# Patient Record
(demographics unavailable — no encounter records)

---

## 2025-03-25 NOTE — REASON FOR VISIT
[Initial Consultation] : an initial consultation for [Mother] : mother [FreeTextEntry3] : Screening for cardiovascular condition

## 2025-03-25 NOTE — CONSULT LETTER
[Today's Date] : [unfilled] [Name] : Name: [unfilled] [] : : ~~ [Today's Date:] : [unfilled] [Dear  ___:] : Dear Dr. [unfilled]: [Consult - Single Provider] : Thank you very much for allowing me to participate in the care of this patient. If you have any questions, please do not hesitate to contact me. [Sincerely,] : Sincerely, [FreeTextEntry4] : Dr. Kalia Snow MD [FreeTextEntry5] :  [id number], [place] [address] [FreeTextEntry6] : [phone]

## 2025-03-25 NOTE — CONSULT LETTER
[Today's Date] : [unfilled] [Name] : Name: [unfilled] [] : : ~~ [Today's Date:] : [unfilled] [Dear  ___:] : Dear Dr. [unfilled]: [Consult - Single Provider] : Thank you very much for allowing me to participate in the care of this patient. If you have any questions, please do not hesitate to contact me. [Sincerely,] : Sincerely, [FreeTextEntry4] : Dr. Kalia Snow MD [FreeTextEntry5] :  [id number], [place] [address] [FreeTextEntry6] : [phone]

## 2025-03-25 NOTE — REVIEW OF SYSTEMS
[Breastmilk] : Breastmilk ~M [___ Formula] : [unfilled] Formula  [___ ounces/feeding] : ~TIEN elliott/feeding [___ Times/day] : [unfilled] times/day

## 2025-05-13 NOTE — REVIEW OF SYSTEMS
[___ Formula] : [unfilled] Formula  [___ ounces/feeding] : ~TIEN elliott/feeding [___ Times/day] : [unfilled] times/day

## 2025-05-21 NOTE — REASON FOR VISIT
[Follow-Up] : a follow-up visit for [Mother] : mother [FreeTextEntry3] : history of LSVC [Time Spent: ____ minutes] : Total time spent using  services: [unfilled] minutes. The patient's primary language is not English thus required  services. [Interpreters_IDNumber] : [id number] [Interpreters_FullName] : Lewis [TWNoteComboBox1] : Libyan

## 2025-05-21 NOTE — REASON FOR VISIT
[Follow-Up] : a follow-up visit for [Mother] : mother [FreeTextEntry3] : history of LSVC [Time Spent: ____ minutes] : Total time spent using  services: [unfilled] minutes. The patient's primary language is not English thus required  services. [Interpreters_IDNumber] : [id number] [Interpreters_FullName] : Lewis [TWNoteComboBox1] : Micronesian

## 2025-05-21 NOTE — CARDIOLOGY SUMMARY
[Today's Date] : [unfilled] [FreeTextEntry1] : Normal sinus rhythm with a ventricular rate of 142 beats per minute. Normal ventricular axis (normal QRS axis of 94 degrees) and normal intervals for age.    [FreeTextEntry2] : Summary:  1. Follow up study to assess left ventricular size and function. 2. Globular left ventricle and mildly dilated left ventricle. 3. Left ventricular ejection fraction by 5/6 Area x Length is borderline decreased at 51 %. 4. No evidence of aortic valve regurgitation. 5. No mitral valve regurgitation. 6. Normal systolic configuration of interventricular septum. 7. No right SVC. 8. 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 9. Patent foramen ovale with left to right shunt, normal variant. 10. Normal right ventricular morphology with qualitatively normal size and systolic function. 11. No pericardial effusion.

## 2025-05-21 NOTE — HISTORY OF PRESENT ILLNESS
[FreeTextEntry1] : Dave Cannon is a 3-month-old female who was seen in the Pediatric Cardiology clinic for a recommended follow up for mild left ventricular dilation with mild mitral and aortic insufficiency. 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  Mother reports that she is doing well and is completely asymptomatic from the cardiac standpoint. She denies any episodes of cyanosis, pallor, tachypnea or increased work of breathing, diaphoresis with feeds. 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 She is now taking Nutramigen  3 ounces every 3 hours and tolerating the feeds. She is gaining weight.  The remainder of review of systems is all negative.  Family and social history was briefly reviewed and is unchanged.

## 2025-05-21 NOTE — CONSULT LETTER
[Today's Date] : [unfilled] [Name] : Name: [unfilled] [] : : ~~ [Today's Date:] : [unfilled] [Dear  ___:] : Dear Dr. [unfilled]: [Consult - Single Provider] : Thank you very much for allowing me to participate in the care of this patient. If you have any questions, please do not hesitate to contact me. [Sincerely,] : Sincerely, [FreeTextEntry4] : Dr. Kalia Snow MD [FreeTextEntry5] : [address], [place] [address]  [de-identified] : ANKITA Batista, FAAP, FSCMR  of Pediatrics [facility]	 Attending Physician, Pediatric Cardiology Director, Pediatric and Adult Congenital Cardiac MRI [facility]

## 2025-05-21 NOTE — DISCUSSION/SUMMARY
[FreeTextEntry1] : In summary, Dave Cannon is a 3-month-old infant girl with persistent mild left ventricular dilation and borderline decreased systolic function on echocardiogram besides a persistent LSVC to dilated CS and a PFO with left to right flow - a normal variant. She is clinically completely asymptomatic and continues to thrive. I discussed with mother that I had discussed this with our heart failure/cardiomyopathy cardiologist - Dr. Baker and he agrees with me that given this is mild and child is thriving, requires no cardiac interventions at this time including no heart failure medications.  we also discussed that its too early to send her for cardiogenomics and would wait to see if there is any progression or this resolves. She will continue to require close surveillance, and I recommended a follow up in 2 months.   The PFO is a normal variant and needs no intervention. In Dave's case, it appears that the right SVC regressed during fetal life and she has a persistent LSVC to dilated CS which is of no hemodynamic significance and a variant.  This was all explained to mother in detail using the . Mother verbalized understanding and all her questions were answered.  [Needs SBE Prophylaxis] : [unfilled] does not need bacterial endocarditis prophylaxis [May participate in all age-appropriate activities] : [unfilled] May participate in all age-appropriate activities.

## 2025-05-21 NOTE — CONSULT LETTER
[Today's Date] : [unfilled] [Name] : Name: [unfilled] [] : : ~~ [Today's Date:] : [unfilled] [Dear  ___:] : Dear Dr. [unfilled]: [Consult - Single Provider] : Thank you very much for allowing me to participate in the care of this patient. If you have any questions, please do not hesitate to contact me. [Sincerely,] : Sincerely, [FreeTextEntry4] : Dr. Kalia Snow MD [FreeTextEntry5] : [address], [place] [address]  [de-identified] : ANKITA Batista, FAAP, FSCMR  of Pediatrics [facility]	 Attending Physician, Pediatric Cardiology Director, Pediatric and Adult Congenital Cardiac MRI [facility]